# Patient Record
Sex: MALE | Race: WHITE
[De-identification: names, ages, dates, MRNs, and addresses within clinical notes are randomized per-mention and may not be internally consistent; named-entity substitution may affect disease eponyms.]

---

## 2018-03-21 ENCOUNTER — HOSPITAL ENCOUNTER (OUTPATIENT)
Dept: HOSPITAL 75 - RAD | Age: 48
Discharge: HOME | End: 2018-03-21
Attending: NURSE PRACTITIONER

## 2018-03-21 PROCEDURE — 73721 MRI JNT OF LWR EXTRE W/O DYE: CPT

## 2018-03-21 NOTE — DIAGNOSTIC IMAGING REPORT
EXAMINATION: Magnetic resonance imaging of the right knee without

intravenous contrast



DATE: March 21, 2018.



COMPARISON: None.



INDICATION: 47-year-old male, right knee injury. Right knee pain.



TECHNIQUE: Multiplanar, multisequence non contrast enhanced MR

imaging was accomplished.



FINDINGS:

There are motion limitations of the exam.



MENISCI:

There is an area of oblique signal within the posterior horn of

the medial meniscus extending towards the inferior surface seen

best on coronal proton density sequence image 10 and sagittal

proton density fat saturation sequence image 20 which likely

relates to a small oblique tear. Additional signal within the

body and posterior horn of the medial meniscus does not meet

strict MRI criteria for tear. There is no medial meniscal

extrusion. There is no parameniscal cyst.



There is an oblique tear involving the body and posterior horn of

the lateral meniscus.



LIGAMENTS AND TENDONS:

There is a complete tear of the anterior cruciate ligament

without visualized anterior cruciate ligament fibers. This may be

a chronic tear of the anterior cruciate ligament. The posterior

cruciate ligament is intact.



The medial collateral ligament is intact.



The iliotibial band, mid third lateral capsular ligament, fibular

collateral ligament, biceps femoris tendon and conjoined tendon

are intact.



The quadriceps tendon and patella ligament are intact.



JOINT:

There is a full-thickness cartilage flap involving the lateral

patellar facet measuring 8 mm in transverse dimension with mild

underlying degenerative related marrow edema. There is

irregularity and thinning of the cartilage of the median patellar

ridge. There is low-level degenerative related marrow edema

underlying the medial patellar facet. There is a full-thickness

cartilage defect involving the lateral tibial plateau on coronal

STIR sequence image 7. There is a cartilage flap involving the

subjacent lateral femoral condyle measuring approximately 7 x 7

mm in size. The medial compartment cartilage is grossly intact.

There is no large knee joint effusion, prominent synovitis, or

identified intra-articular body.    



BONE:

There is a displaced fracture of the proximal fibula perhaps best

illustrated on axial T2 fat saturation sequence image 20 with

adjacent marrow edema and adjacent soft tissue edema. There is no

gross offset of the articulating surface of the proximal fibula

at the proximal tibiofibular articulation. There is an

essentially nondisplaced fracture involving the posterior aspect

of the lateral tibial plateau seen best on sagittal proton

density fat saturation sequence image 5 with adjacent marrow

edema. There is no identified offset of the articulating surface.

There is edema-like signal in the lateral femoral condyle which

potentially may reflect bone contusion and/or

degenerative-related marrow edema.



BURSAE AND SOFT TISSUES:

There is no Baker's cyst.



IMPRESSION: 

1. Essentially nondisplaced fracture of the posterior aspect of

the lateral tibial plateau. No identified offset of the

articulating surface.

2. Displaced proximal fibular fracture without identified offset

of the articulating surface of the fibula at the proximal

tibiofibular articulation.

3. Edema like signal in the lateral femoral condyle which may

relate to bone contusion and/or degenerative-related marrow

edema.

4. Oblique tear involving the body and posterior horn of the

lateral meniscus.

5. Small oblique tear involving the posterior horn of the medial

meniscus.

6. Complete tear of the anterior cruciate ligament which is

likely chronic.

7. Patellofemoral and lateral compartment cartilage defects. No

large knee joint effusion, identified intra-articular body, or

prominent synovitis.



Report was called/FAXED to [facility] by arnold at 1:29 PM.



Dictated by: 



  Dictated on workstation # [id number]

## 2018-12-30 ENCOUNTER — HOSPITAL ENCOUNTER (EMERGENCY)
Dept: HOSPITAL 75 - ER | Age: 48
Discharge: HOME | End: 2018-12-30
Payer: COMMERCIAL

## 2018-12-30 VITALS — BODY MASS INDEX: 29.7 KG/M2 | HEIGHT: 68 IN | WEIGHT: 196 LBS

## 2018-12-30 VITALS — SYSTOLIC BLOOD PRESSURE: 154 MMHG | DIASTOLIC BLOOD PRESSURE: 82 MMHG

## 2018-12-30 DIAGNOSIS — G47.00: Primary | ICD-10-CM

## 2018-12-30 DIAGNOSIS — F41.9: ICD-10-CM

## 2018-12-30 DIAGNOSIS — K21.9: ICD-10-CM

## 2018-12-30 PROCEDURE — 99281 EMR DPT VST MAYX REQ PHY/QHP: CPT

## 2018-12-30 NOTE — ED GENERAL
General


Chief Complaint:  General Problems/Pain


Stated Complaint:  DIFFICULTY SLEEPING PAST 3 DAYS


Nursing Triage Note:  


PT CO OF DIFFICULTY SLEEPING FOR PAST 3 DAYS


Nursing Sepsis Screen:  No Definite Risk


Source of Information:  Patient


Exam Limitations:  No Limitations





History of Present Illness


Date Seen by Provider:  Dec 30, 2018


Time Seen by Provider:  10:48


Initial Comments


To ER per private vehicle from home with reports of difficulty sleeping for the 

past 3 days.  He tried an over-the-counter sleep aid without success. He states 

that he awakens with cramping in his abdomen and palpitations and feeling very 

anxious. He has had similar episode before and was given a short course of 

Xanax which resolved his symptoms. Primary care's Dr. Butts.


Timing/Duration:  2-3 Days


Severity:  Moderate





Allergies and Home Medications


Allergies


Coded Allergies:  


     No Known Drug Allergies (Unverified , 5/11/10)





Home Medications


Esomeprazole Magnesium 20 Mg Capsule.dr, 20 MG PO DAILY, (Reported)


Sulfamethoxazole/Trimethoprim 1 Each Tablet, 1 EACH PO BID


   Prescribed by: DESEAN FRIAS on 10/14/15 0359





Patient Home Medication List


Home Medication List Reviewed:  Yes





Review of Systems


Review of Systems


Constitutional:  see HPI


EENTM:  see HPI


Respiratory:  no symptoms reported


Cardiovascular:  no symptoms reported


Genitourinary:  no symptoms reported


Musculoskeletal:  no symptoms reported


Skin:  no symptoms reported


Psychiatric/Neurological:  No Symptoms Reported


Hematologic/Lymphatic:  No Symptoms Reported


Immunological/Allergic:  no symptoms reported





Past Medical-Social-Family Hx


Patient Social History


Recent Foreign Travel:  No


Contact w/Someone Who Travel:  No


Recent Infectious Disease Expo:  No





Past Medical History


Gastroesophageal Reflux





Physical Exam


Vital Signs





Vital Signs - First Documented








 12/30/18





 10:05


 


Temp 97.0


 


Pulse 94


 


Resp 18


 


B/P (MAP) 154/82 (106)


 


Pulse Ox 98





Capillary Refill : Less Than 3 Seconds


Height, Weight, BMI


Height: 5'8.00"


Weight: 196lbs. oz. [id number];  BMI


Method:Stated


General Appearance:  No Apparent Distress, WD/WN


Eyes:  Bilateral Eye Normal Inspection, Bilateral Eye PERRL, Bilateral Eye EOMI


HEENT:  PERRL/EOMI, TMs Normal, Normal ENT Inspection


Neck:  Full Range of Motion, Normal Inspection


Respiratory:  No Accessory Muscle Use, No Respiratory Distress


Cardiovascular:  Regular Rate, Rhythm, Normal Peripheral Pulses


Gastrointestinal:  Normal Bowel Sounds, No Organomegaly, Non Tender


Extremity:  Normal Capillary Refill, Normal Inspection


Neurologic/Psychiatric:  Alert, Oriented x3, No Motor/Sensory Deficits


Skin:  Normal Color, Warm/Dry





Procedures/Interventions





   Suture Size:  4-0





Progress/Results/Core Measures


Suspected Sepsis


Recent Fever Within 48 Hours:  No


Infection Criteria Present:  Suspected New Infection


New/Unexplained  Altered Menta:  No


Sepsis Screen:  No Definite Risk


SIRS


Temperature:97.0 


Pulse: 94 


Respiratory Rate: 18


 


Blood Pressure 154 /82 


Mean: 106





Results/Orders


Vital Signs/I&O











 12/30/18





 10:05


 


Temp 97.0


 


Pulse 94


 


Resp 18


 


B/P (MAP) 154/82 (106)


 


Pulse Ox 98





Capillary Refill : Less Than 3 Seconds








Blood Pressure Mean:  106











Departure


Impression





 Primary Impression:  


 Insomnia


 Qualified Codes:  G47.00 - Insomnia, unspecified


 Additional Impression:  


 Anxiety


Disposition:  01 HOME, SELF-CARE


Condition:  Stable





Departure-Patient Inst.


Decision time for Depature:  10:50


Referrals:  


MANUELA BUTTS MD (PCP/Family)


Primary Care Physician


Patient Instructions:  Insomnia





Add. Discharge Instructions:  


1. Take one of the lorazepam at bedtime. You may also take one during the 

daytime if you feel particularly anxious or half of the tablet during the 

daytime if you feel anxious. However, this may make you sleepy so do not drive 

after taking these. Call Dr. Butts on Monday to make an appointment to be 

seen as he may wish to start you on a medication that will better control your 

anxiety. All discharge instructions reviewed with patient and/or family. Voiced 

understanding.


Scripts


Lorazepam (Lorazepam) 0.5 Mg Tablet


0.5 MG PO BID PRN for ANXIETY, #10 TAB


   Prov: RICHARD DODSON         12/30/18





Copy


Copies To 1:   MANUELA BUTTS MD, PETER J APRN Dec 30, 2018 10:52

## 2020-01-10 ENCOUNTER — HOSPITAL ENCOUNTER (EMERGENCY)
Dept: HOSPITAL 75 - ER | Age: 50
Discharge: HOME | End: 2020-01-10
Payer: COMMERCIAL

## 2020-01-10 VITALS — BODY MASS INDEX: 28.93 KG/M2 | WEIGHT: 188.72 LBS | HEIGHT: 67.72 IN

## 2020-01-10 VITALS — SYSTOLIC BLOOD PRESSURE: 148 MMHG | DIASTOLIC BLOOD PRESSURE: 95 MMHG

## 2020-01-10 DIAGNOSIS — F41.9: ICD-10-CM

## 2020-01-10 DIAGNOSIS — F32.9: ICD-10-CM

## 2020-01-10 DIAGNOSIS — K21.9: ICD-10-CM

## 2020-01-10 DIAGNOSIS — L50.9: Primary | ICD-10-CM

## 2020-01-10 PROCEDURE — 99283 EMERGENCY DEPT VISIT LOW MDM: CPT

## 2020-01-10 NOTE — ED GENERAL
General


Chief Complaint:  Allergic Reaction


Stated Complaint:  RASH ON ARMS & LEGS


Nursing Triage Note:  


AMBULATORY TO ED ROOM 5 WITH C/O HIVES TO BILATERAL ARMS SPOTS TO TOP OF 


BILATERAL THIGHS, AND PATCHES THROUGHOUT TRUNK. DENIES LIP, TONGUE, OR THROAT 


SWELLING. DENIES NEW MEDICINES OR NEW FOODS. NO OTC MEDICATIONS PTA.


Nursing Sepsis Screen:  No Definite Risk


Source of Information:  Patient


Exam Limitations:  No Limitations





History of Present Illness


Date Seen by Provider:  Dc 10, 2020


Time Seen by Provider:  01:10


Initial Comments


This 49-year-old gentleman presents to the emergency room with complaints of 

mildly pruritic hives primarily affecting his bilateral forearms and thighs.  He

has some more mild scattered areas.  He just noticed the hives this evening.  He

denies any new exposures or foods other than eating some [employer] ice cream 

that he had not had before.  He has been on his medications for quite some time 

and has not noticed any problems with them in the past.  He has not taken any 

medications to treat the symptoms yet.





Allergies and Home Medications


Allergies


Coded Allergies:  


     No Known Drug Allergies (Unverified , 5/11/10)





Home Medications


Esomeprazole Magnesium 20 Mg Capsule.dr, 20 MG PO DAILY, (Reported)


Lorazepam 0.5 Mg Tablet, 0.5 MG PO BID PRN for ANXIETY


   Prescribed by: RICHARD DODSON on 12/30/18 1051


Prednisone 20 Mg Tab, 40 MG PO DAILY


   Prescribed by: DELMIS MARQUEZ on 1/10/20 0214


Sulfamethoxazole/Trimethoprim 1 Each Tablet, 1 EACH PO BID


   Prescribed by: DESEAN FRIAS on 10/14/15 0359





Patient Home Medication List


Home Medication List Reviewed:  Yes





Review of Systems


Review of Systems


Constitutional:  no symptoms reported


EENTM:  no symptoms reported


Respiratory:  no symptoms reported


Cardiovascular:  no symptoms reported


Gastrointestinal:  no symptoms reported


Genitourinary:  no symptoms reported


Musculoskeletal:  no symptoms reported


Skin:  see HPI


Psychiatric/Neurological:  No Symptoms Reported


Hematologic/Lymphatic:  No Symptoms Reported





Past Medical-Social-Family Hx


Past Med/Social Hx:  Reviewed Nursing Past Med/Soc Hx


Patient Social History


Alcohol Use:  Denies Use


Recreational Drug Use:  No


Smoking Status:  Never a Smoker


Recent Foreign Travel:  No


Contact w/Someone Who Travel:  No


Recent Infectious Disease Expo:  No


Recent Hopitalizations:  No


Physical Abuse:  No


Sexual Abuse:  No


Mistreated:  No


Fear:  No





Past Medical History


Surgeries:  Yes (DENTAL)


Respiratory:  No


Cardiac:  No


Neurological:  No


Sexually Transmitted Disease:  No


Genitourinary:  No


Gastrointestinal:  Yes


Gastroesophageal Reflux


Musculoskeletal:  No


Endocrine:  No


HEENT:  No


Cancer:  No


Psychosocial:  Yes


Anxiety, Depression


Integumentary:  No


Blood Disorders:  No





Physical Exam


Vital Signs





Vital Signs - First Documented








 1/10/20 1/10/20





 01:06 02:17


 


Temp 36.6 


 


Pulse 80 


 


Resp 18 


 


B/P (MAP) 151/95 (113) 


 


Pulse Ox  98


 


O2 Delivery Room Air 





Capillary Refill : Less Than 3 Seconds


Height, Weight, BMI


Height: 5'8.00"


Weight: 196lbs. oz. 88.347335bl; 28.00 BMI


Method:Stated


General Appearance:  No Apparent Distress, WD/WN


HEENT:  PERRL/EOMI, Normal ENT Inspection, Pharynx Normal


Neck:  Normal Inspection


Respiratory:  Lungs Clear, Normal Breath Sounds, No Accessory Muscle Use


Cardiovascular:  Regular Rate, Rhythm, No Edema, No Murmur


Extremity:  Normal Inspection, Non Tender


Neurologic/Psychiatric:  Alert, Oriented x3, No Motor/Sensory Deficits, Normal 

Mood/Affect, CNs II-XII Norm as Tested


Skin:  Warm/Dry, Rash (large hives on the distal upper extremities.  Smaller 

scattered hives elsewhere)





Procedures/Interventions





   Suture Size:  4-0





Progress/Results/Core Measures


Suspected Sepsis


Recent Fever Within 48 Hours:  No


Infection Criteria Present:  None


New/Unexplained  Altered Menta:  No


Sepsis Screen:  No Definite Risk


SIRS


Temperature: 


Pulse: 80 


Respiratory Rate: 18


 


Blood Pressure 151 /95 


Mean: 113





Results/Orders


My Orders





Orders - DELMIS KEMP MD


Famotidine Tablet (Pepcid Tablet) (1/10/20 01:30)


Diphenhydramine Tablet (Benadryl Tablet) (1/10/20 01:30)


Prednisone Tablet (Deltasone Tablet) (1/10/20 01:30)





Medications Given in ED





Current Medications








 Medications  Dose


 Ordered  Sig/Jeanie


 Route  Start Time


 Stop Time Status Last Admin


Dose Admin


 


 Diphenhydramine


 HCl  50 mg  ONCE  ONCE


 PO  1/10/20 01:30


 1/10/20 01:31 DC 1/10/20 01:25


50 MG


 


 Famotidine  40 mg  ONCE  ONCE


 PO  1/10/20 01:30


 1/10/20 01:31 DC 1/10/20 01:25


40 MG


 


 Prednisone  40 mg  ONCE  ONCE


 PO  1/10/20 01:30


 1/10/20 01:31 DC 1/10/20 01:25


40 MG








Vital Signs/I&O











 1/10/20 1/10/20





 01:06 02:17


 


Temp 36.6 36.6


 


Pulse 80 86


 


Resp 18 18


 


B/P (MAP) 151/95 (113) 148/95 (113)


 


Pulse Ox  98


 


O2 Delivery Room Air Room Air





Capillary Refill : Less Than 3 Seconds








Blood Pressure Mean:                    113








Progress Note :  


Progress Note


Patient was treated with oral Benadryl, Pepcid, and prednisone with mild 

improvement.  He developed no involvement of the lips, tongue, mouth, or airway.





Departure


Impression





   Primary Impression:  


   Hives


Disposition:  01 HOME, SELF-CARE


Condition:  Improved





Departure-Patient Inst.


Decision time for Depature:  02:09


Referrals:  


MANUELA BUTTS MD (PCP/Family)


Primary Care Physician


Patient Instructions:  Hives





Add. Discharge Instructions:  


Avoid any possible triggers that may have caused your hives.


You may take Benadryl (diphenhydramine) up to 50 mg every 4 hours as needed for 

itching and hives.


For the next few days take Pepcid (famotidine) 20 mg twice daily as well as your

daily dose of prednisone to prevent rebound.


Return to the emergency room or call 911 if you develop symptoms that involve 

your lips, tongue, throat, or breathing.  Take Benadryl (diphenhydramine) 50 mg 

immediately if you have this type of reaction.











All discharge instructions reviewed with patient and/or family. Voiced 

understanding.


Scripts


Prednisone (Prednisone) 20 Mg Tab


40 MG PO DAILY, #6 TAB 0 Refills


   Prov: DELMIS KEMP MD         1/10/20











DELMIS KEMP MD        Dc 10, 2020 02:10